# Patient Record
Sex: MALE | URBAN - METROPOLITAN AREA
[De-identification: names, ages, dates, MRNs, and addresses within clinical notes are randomized per-mention and may not be internally consistent; named-entity substitution may affect disease eponyms.]

---

## 2024-02-09 ENCOUNTER — APPOINTMENT (RX ONLY)
Dept: URBAN - METROPOLITAN AREA CLINIC 173 | Facility: CLINIC | Age: 44
Setting detail: DERMATOLOGY
End: 2024-02-09

## 2024-02-09 NOTE — HPI: SURGICAL CONSULTATION
Has The Lesion Been Biopsied Before?: has not been previously biopsied
Additional History: Lesion was evaluated by dermatologist previously, reports the were not concerned but offered to remove with cryotherapy. Patient denied treatment at the time out of concern for scarring. Interested in discussing options for removal today.
Who Is Your Referring Physician?: n/a
What Is The Location Of The Lesion?: Left forehead